# Patient Record
Sex: MALE | Race: WHITE | Employment: FULL TIME | URBAN - METROPOLITAN AREA
[De-identification: names, ages, dates, MRNs, and addresses within clinical notes are randomized per-mention and may not be internally consistent; named-entity substitution may affect disease eponyms.]

---

## 2024-09-18 ENCOUNTER — HOSPITAL ENCOUNTER (EMERGENCY)
Age: 54
Discharge: HOME | End: 2024-09-18
Attending: EMERGENCY MEDICINE
Payer: COMMERCIAL

## 2024-09-18 VITALS
WEIGHT: 172 LBS | RESPIRATION RATE: 18 BRPM | BODY MASS INDEX: 22.07 KG/M2 | OXYGEN SATURATION: 96 % | HEART RATE: 87 BPM | HEIGHT: 74 IN | TEMPERATURE: 98.5 F | DIASTOLIC BLOOD PRESSURE: 84 MMHG | SYSTOLIC BLOOD PRESSURE: 160 MMHG

## 2024-09-18 DIAGNOSIS — M54.31 SCIATICA OF RIGHT SIDE: Primary | ICD-10-CM

## 2024-09-18 DIAGNOSIS — M79.18 RIGHT BUTTOCK PAIN: ICD-10-CM

## 2024-09-18 PROCEDURE — 6370000000 HC RX 637 (ALT 250 FOR IP): Performed by: EMERGENCY MEDICINE

## 2024-09-18 PROCEDURE — 99283 EMERGENCY DEPT VISIT LOW MDM: CPT

## 2024-09-18 RX ORDER — IBUPROFEN 800 MG/1
800 TABLET, FILM COATED ORAL ONCE
Status: COMPLETED | OUTPATIENT
Start: 2024-09-18 | End: 2024-09-18

## 2024-09-18 RX ORDER — IBUPROFEN 800 MG/1
800 TABLET, FILM COATED ORAL EVERY 8 HOURS PRN
Qty: 30 TABLET | Refills: 0 | Status: SHIPPED | OUTPATIENT
Start: 2024-09-18

## 2024-09-18 RX ORDER — CYCLOBENZAPRINE HCL 10 MG
10 TABLET ORAL 3 TIMES DAILY PRN
Qty: 30 TABLET | Refills: 0 | Status: SHIPPED | OUTPATIENT
Start: 2024-09-18 | End: 2024-09-28

## 2024-09-18 RX ORDER — CYCLOBENZAPRINE HCL 10 MG
10 TABLET ORAL ONCE
Status: COMPLETED | OUTPATIENT
Start: 2024-09-18 | End: 2024-09-18

## 2024-09-18 RX ORDER — METHYLPREDNISOLONE 4 MG
TABLET, DOSE PACK ORAL
Qty: 1 KIT | Refills: 0 | Status: SHIPPED | OUTPATIENT
Start: 2024-09-18 | End: 2024-09-24

## 2024-09-18 RX ADMIN — IBUPROFEN 800 MG: 800 TABLET, FILM COATED ORAL at 18:42

## 2024-09-18 RX ADMIN — CYCLOBENZAPRINE 10 MG: 10 TABLET, FILM COATED ORAL at 18:42

## 2024-09-18 ASSESSMENT — PAIN SCALES - GENERAL: PAINLEVEL_OUTOF10: 2

## 2024-09-18 ASSESSMENT — PAIN - FUNCTIONAL ASSESSMENT: PAIN_FUNCTIONAL_ASSESSMENT: 0-10

## 2024-09-30 NOTE — PROGRESS NOTES
of Transportation (Non-Medical): No   Physical Activity: Not on file   Stress: Not on file   Social Connections: Not on file   Intimate Partner Violence: Not on file   Housing Stability: Unknown (10/1/2024)    Housing Stability Vital Sign    • Unable to Pay for Housing in the Last Year: Not on file    • Number of Times Moved in the Last Year: Not on file    • Homeless in the Last Year: No        Family History   Problem Relation Age of Onset   • Unknown Mother    • Anemia Mother    • Cancer Brother        PE  Vitals:    10/01/24 1449 10/01/24 1456   BP:  125/80   Site:  Left Upper Arm   Position:  Sitting   Cuff Size:  Medium Adult   Pulse:  93   Resp:  16   SpO2:  98%   Weight: 77.6 kg (171 lb)      Estimated body mass index is 21.96 kg/m² as calculated from the following:    Height as of 9/18/24: 1.88 m (6' 2\").    Weight as of this encounter: 77.6 kg (171 lb).    Physical Exam  Constitutional:       Appearance: Normal appearance.   Cardiovascular:      Rate and Rhythm: Normal rate and regular rhythm.      Heart sounds: No murmur heard.  Pulmonary:      Effort: Pulmonary effort is normal.      Breath sounds: Normal breath sounds. No wheezing.   Musculoskeletal:         General: Tenderness (Lateral aspect of right lower extremity [lateral gastrocnemius area]) present. No swelling or deformity. Normal range of motion.      Cervical back: Normal range of motion.      Lumbar back: No tenderness.      Right lower leg: No edema.      Left lower leg: No edema.      Comments: No hip tenderness to palpation, unable to reproduce click in hip with lateral flexion of right hip. Negative FADIR test of right hip.  No tenderness along iliotibial band of the right leg.    Skin:     General: Skin is warm.      Findings: No bruising, erythema, lesion or rash.   Neurological:      Mental Status: He is alert.      Sensory: Sensation is intact. No sensory deficit.      Motor: Weakness (4 out of 5 strength for right knee extension and

## 2024-10-01 ENCOUNTER — OFFICE VISIT (OUTPATIENT)
Dept: FAMILY MEDICINE CLINIC | Age: 54
End: 2024-10-01
Payer: COMMERCIAL

## 2024-10-01 VITALS
HEART RATE: 93 BPM | WEIGHT: 171 LBS | SYSTOLIC BLOOD PRESSURE: 125 MMHG | OXYGEN SATURATION: 98 % | DIASTOLIC BLOOD PRESSURE: 80 MMHG | RESPIRATION RATE: 16 BRPM | BODY MASS INDEX: 21.96 KG/M2

## 2024-10-01 DIAGNOSIS — M25.551 RIGHT HIP PAIN: Primary | ICD-10-CM

## 2024-10-01 DIAGNOSIS — M79.604 PAIN IN LATERAL RIGHT LOWER EXTREMITY: ICD-10-CM

## 2024-10-01 PROCEDURE — 99202 OFFICE O/P NEW SF 15 MIN: CPT

## 2024-10-01 PROCEDURE — G8484 FLU IMMUNIZE NO ADMIN: HCPCS

## 2024-10-01 PROCEDURE — G8420 CALC BMI NORM PARAMETERS: HCPCS

## 2024-10-01 PROCEDURE — G8427 DOCREV CUR MEDS BY ELIG CLIN: HCPCS

## 2024-10-01 PROCEDURE — 3017F COLORECTAL CA SCREEN DOC REV: CPT

## 2024-10-01 PROCEDURE — 1036F TOBACCO NON-USER: CPT

## 2024-10-01 SDOH — ECONOMIC STABILITY: FOOD INSECURITY: WITHIN THE PAST 12 MONTHS, THE FOOD YOU BOUGHT JUST DIDN'T LAST AND YOU DIDN'T HAVE MONEY TO GET MORE.: NEVER TRUE

## 2024-10-01 SDOH — ECONOMIC STABILITY: INCOME INSECURITY: HOW HARD IS IT FOR YOU TO PAY FOR THE VERY BASICS LIKE FOOD, HOUSING, MEDICAL CARE, AND HEATING?: NOT VERY HARD

## 2024-10-01 SDOH — ECONOMIC STABILITY: FOOD INSECURITY: WITHIN THE PAST 12 MONTHS, YOU WORRIED THAT YOUR FOOD WOULD RUN OUT BEFORE YOU GOT MONEY TO BUY MORE.: NEVER TRUE

## 2024-10-01 ASSESSMENT — PATIENT HEALTH QUESTIONNAIRE - PHQ9
SUM OF ALL RESPONSES TO PHQ QUESTIONS 1-9: 0
SUM OF ALL RESPONSES TO PHQ QUESTIONS 1-9: 0
1. LITTLE INTEREST OR PLEASURE IN DOING THINGS: NOT AT ALL
2. FEELING DOWN, DEPRESSED OR HOPELESS: NOT AT ALL
SUM OF ALL RESPONSES TO PHQ9 QUESTIONS 1 & 2: 0
SUM OF ALL RESPONSES TO PHQ QUESTIONS 1-9: 0
SUM OF ALL RESPONSES TO PHQ QUESTIONS 1-9: 0

## 2024-10-02 SDOH — HEALTH STABILITY: PHYSICAL HEALTH: ON AVERAGE, HOW MANY MINUTES DO YOU ENGAGE IN EXERCISE AT THIS LEVEL?: 90 MIN

## 2024-10-02 SDOH — HEALTH STABILITY: PHYSICAL HEALTH: ON AVERAGE, HOW MANY DAYS PER WEEK DO YOU ENGAGE IN MODERATE TO STRENUOUS EXERCISE (LIKE A BRISK WALK)?: 6 DAYS

## 2024-10-03 ENCOUNTER — OFFICE VISIT (OUTPATIENT)
Dept: ORTHOPEDIC SURGERY | Age: 54
End: 2024-10-03
Payer: COMMERCIAL

## 2024-10-03 ENCOUNTER — TELEPHONE (OUTPATIENT)
Dept: ORTHOPEDIC SURGERY | Age: 54
End: 2024-10-03

## 2024-10-03 VITALS — HEIGHT: 74 IN | WEIGHT: 171 LBS | BODY MASS INDEX: 21.94 KG/M2

## 2024-10-03 DIAGNOSIS — S76.311A TEAR OF RIGHT HAMSTRING: ICD-10-CM

## 2024-10-03 DIAGNOSIS — M54.31 SCIATICA OF RIGHT SIDE: ICD-10-CM

## 2024-10-03 DIAGNOSIS — R52 PAIN: Primary | ICD-10-CM

## 2024-10-03 PROCEDURE — 99203 OFFICE O/P NEW LOW 30 MIN: CPT | Performed by: ORTHOPAEDIC SURGERY

## 2024-10-03 PROCEDURE — 3017F COLORECTAL CA SCREEN DOC REV: CPT | Performed by: ORTHOPAEDIC SURGERY

## 2024-10-03 PROCEDURE — G8427 DOCREV CUR MEDS BY ELIG CLIN: HCPCS | Performed by: ORTHOPAEDIC SURGERY

## 2024-10-03 PROCEDURE — 1036F TOBACCO NON-USER: CPT | Performed by: ORTHOPAEDIC SURGERY

## 2024-10-03 PROCEDURE — G8484 FLU IMMUNIZE NO ADMIN: HCPCS | Performed by: ORTHOPAEDIC SURGERY

## 2024-10-03 PROCEDURE — G8420 CALC BMI NORM PARAMETERS: HCPCS | Performed by: ORTHOPAEDIC SURGERY

## 2024-10-03 NOTE — PROGRESS NOTES
apprehension  []  []Not tested   []  []Not tested    Uncontained Internal rotation  []  []Not tested  []  []Not tested          Abductors  [] All Neg  [] Not tested   [] All Neg  [] Not tested    Medius strength  []  []Not tested   []  []Not tested    Minimum strength  []  []Not tested   []  []Not tested    IT band tendonitis  []  []Not tested   []  []Not tested    Trochanteric tenderness  []  []Not tested  []  []Not tested   Sciatic neuropathic pain  []  []Not tested   []  []Not tested           Post-arthroplasty  [] All Neg  [] Not tested   [] All Neg  [] Not tested    Rectus tendonitis  []  []Not tested   []  []Not tested    Iliopsoas tendonitis       Start-up pain  []  []Not tested   []  []Not tested          Imaging    X-rays were ordered today ordered and reviewed of the lumbar spine.  2 views.  AP and lateral views.  No evidence of fractures or dislocations.    1 view of the hip was also ordered.  AP pelvis.  No evidence of fractures or dislocations with well-maintained joint space      Procedure:  Orders Placed This Encounter   Procedures    XR LUMBAR SPINE (2-3 VIEWS)     Standing Status:   Future     Number of Occurrences:   1     Standing Expiration Date:   10/3/2025       Assessment and Plan  Santana was seen today for hip pain.    Diagnoses and all orders for this visit:    Pain  -     XR LUMBAR SPINE (2-3 VIEWS); Future    Tear of right hamstring  -     MRI FEMUR RIGHT WO CONTRAST; Future    Sciatica of right side  -     MRI FEMUR RIGHT WO CONTRAST; Future        Patient has a mid substance hamstring injury.  Possible some involvement of its insertion onto the ischial tuberosity.  We will order an MRI right femur.  Patient also is presenting sciatic neuritis symptoms.  He may need a sciatic nerve release and possible hamstring repair.    I discussed with Santana Petty that his history, symptoms, signs, and imaging are most consistent with  hamstring tear .    We reviewed the natural history of these

## 2024-10-03 NOTE — TELEPHONE ENCOUNTER
MRI RIGHT FEMUR approved Auth# [id number]     Was approved for [facility]   Valid 10/3/2024 - 11/17/2024

## 2024-10-21 ENCOUNTER — OFFICE VISIT (OUTPATIENT)
Dept: ORTHOPEDIC SURGERY | Age: 54
End: 2024-10-21
Payer: COMMERCIAL

## 2024-10-21 VITALS — BODY MASS INDEX: 21.94 KG/M2 | HEIGHT: 74 IN | WEIGHT: 171 LBS

## 2024-10-21 DIAGNOSIS — S76.311A PARTIAL HAMSTRING TEAR, RIGHT, INITIAL ENCOUNTER: Primary | ICD-10-CM

## 2024-10-21 PROCEDURE — 3017F COLORECTAL CA SCREEN DOC REV: CPT | Performed by: PHYSICIAN ASSISTANT

## 2024-10-21 PROCEDURE — G8484 FLU IMMUNIZE NO ADMIN: HCPCS | Performed by: PHYSICIAN ASSISTANT

## 2024-10-21 PROCEDURE — 1036F TOBACCO NON-USER: CPT | Performed by: PHYSICIAN ASSISTANT

## 2024-10-21 PROCEDURE — G8427 DOCREV CUR MEDS BY ELIG CLIN: HCPCS | Performed by: PHYSICIAN ASSISTANT

## 2024-10-21 PROCEDURE — 99214 OFFICE O/P EST MOD 30 MIN: CPT | Performed by: PHYSICIAN ASSISTANT

## 2024-10-21 PROCEDURE — G8420 CALC BMI NORM PARAMETERS: HCPCS | Performed by: PHYSICIAN ASSISTANT

## 2024-10-21 NOTE — PROGRESS NOTES
Dr Sherwin Calvin      Date /Time 10/21/2024       8:42 AM EDT  Name Santana Petty             1970   [facility]  MRN [id number]                Chief Complaint   Patient presents with    Follow-up     TR MRI Right Thigh        History of Present Illness    Santana Petty is a 53 y.o. male who presents with  right hip pain.    Sent in consultation by No primary care provider on file., .      Injury Mechanism:  sports injury.  Worker's Comp. & legal issues:   none.  Previous Treatments: Ice, Heat, and NSAIDs    Patient presents the office today.  Patient being treated for hamstring injury.  Patient did have an MRI done of the right femur.  His pain has dramatically improved since last visit.  His current pain is 1/10.  His sciatic type symptoms which he had at his last visit have resolved at this time.  He denies any weakness or neurologic symptoms.    Previous History: Patient presents the office today for a new problem.  Patient here with a chief complaint of right hip pain.  On 2024 patient was participating in [employer].  He did a kick and felt a sharp pain over his ischial tuberosity.  He had a sharp pain that shot down his leg and the sciatic nerve distribution.  His hip pain has improved but continues have significant sciatic type symptoms.  He denies any weakness.    Past History  History reviewed. No pertinent past medical history.  Past Surgical History:   Procedure Laterality Date    SPINE SURGERY      10-15 years ago     Family History   Problem Relation Age of Onset    Unknown Mother     Anemia Mother     Cancer Brother      Social History     Tobacco Use    Smoking status: Former     Current packs/day: 0.00     Types: Cigarettes     Quit date:      Years since quittin.8     Passive exposure: Past    Smokeless tobacco: Never   Substance Use Topics    Alcohol use: Not Currently      Current Outpatient Medications on File Prior to Visit   Medication Sig Dispense

## 2024-10-22 DIAGNOSIS — N43.3 HYDROCELE, UNSPECIFIED HYDROCELE TYPE: Primary | ICD-10-CM

## 2024-10-31 ENCOUNTER — HOSPITAL ENCOUNTER (OUTPATIENT)
Dept: PHYSICAL THERAPY | Age: 54
Setting detail: THERAPIES SERIES
Discharge: HOME | End: 2024-10-31
Payer: COMMERCIAL

## 2024-10-31 DIAGNOSIS — R53.1 WEAKNESS: ICD-10-CM

## 2024-10-31 DIAGNOSIS — M79.604 RIGHT LEG PAIN: Primary | ICD-10-CM

## 2024-10-31 DIAGNOSIS — S76.301S HAMSTRING INJURY, RIGHT, SEQUELA: ICD-10-CM

## 2024-10-31 PROCEDURE — 97140 MANUAL THERAPY 1/> REGIONS: CPT

## 2024-10-31 PROCEDURE — 97161 PT EVAL LOW COMPLEX 20 MIN: CPT

## 2024-10-31 PROCEDURE — 97110 THERAPEUTIC EXERCISES: CPT

## 2024-10-31 NOTE — PLAN OF CARE
[facility]- Outpatient Rehabilitation and Therapy [address] [place] [address], [address], [place] [address] office: [phone] fax: [phone]     Physical Therapy Initial Evaluation Certification      Dear Santana Rose PA-C ,    We had the pleasure of evaluating the following patient for physical therapy services at [facility].  A summary of our findings can be found in the initial assessment below.  This includes our plan of care.  If you have any questions or concerns regarding these findings, please do not hesitate to contact me at the office phone number listed above.  Thank you for the referral.     Physician Signature:_______________________________Date:__________________  By signing above (or electronic signature), therapist’s plan is approved by physician       Physical Therapy: TREATMENT/PROGRESS NOTE   Patient: Santana Petty (53 y.o. male)   Examination Date: 10/31/2024   :  1970 MRN: [id number]   Visit #: 1   Insurance Allowable Auth Needed   20 v hard max []Yes    [x]No    Insurance: Payor: UNITED HE[employer]CARE / Plan: [employer] - CHOICE PLU / Product Type: *No Product type* /   Insurance ID: [id number] - (Commercial)  Secondary Insurance (if applicable):    Treatment Diagnosis:     ICD-10-CM    1. Right leg pain  M79.604       2. Hamstring injury, right, sequela  S76.301S       3. Weakness  R53.1          Medical Diagnosis:  Partial hamstring tear, right, initial encounter [S76.311A]   Referring Physician: Santana Rose PA-C  PCP: No primary care provider on file.     Plan of care signed (Y/N):     Date of Patient follow up with Physician:      Plan of Care Report: EVAL today  POC update due: (10 visits /OR AUTH LIMITS, whichever is less)   2024                                             Medical History:  Comorbidities:  Osteoarthritis  Prior Surgeries: L5S1 discectomy about 10 years ago  Relevant Medical History: No past medical history on

## 2024-11-05 ENCOUNTER — HOSPITAL ENCOUNTER (OUTPATIENT)
Dept: PHYSICAL THERAPY | Age: 54
Setting detail: THERAPIES SERIES
Discharge: HOME | End: 2024-11-05
Payer: COMMERCIAL

## 2024-11-05 PROCEDURE — 97140 MANUAL THERAPY 1/> REGIONS: CPT

## 2024-11-05 PROCEDURE — 97110 THERAPEUTIC EXERCISES: CPT

## 2024-11-08 ENCOUNTER — HOSPITAL ENCOUNTER (OUTPATIENT)
Dept: PHYSICAL THERAPY | Age: 54
Setting detail: THERAPIES SERIES
Discharge: HOME | End: 2024-11-08
Payer: COMMERCIAL

## 2024-11-08 PROCEDURE — 97112 NEUROMUSCULAR REEDUCATION: CPT

## 2024-11-08 PROCEDURE — 97530 THERAPEUTIC ACTIVITIES: CPT

## 2024-11-08 PROCEDURE — 97110 THERAPEUTIC EXERCISES: CPT

## 2024-11-08 NOTE — FLOWSHEET NOTE
education on joint protection, postural re-education, activity modification, and progression of HEP    Plan: Cont POC- Continue emphasis/focus on exercise progression, improving proper muscle recruitment and activation/motor control patterns, reduce/eliminate soft tissue swelling/inflammation/restriction, and improving soft tissue extensibility. Next visit plan to continue current phase     Electronically Signed by Nalini Felix PT  Date: 11/08/2024     Note: Portions of this note have been templated and/or copied from initial evaluation, reassessments and prior notes for documentation efficiency.    Note: If patient does not return for scheduled/recommended follow up visits, this note will serve as a discharge from care along with the most recent update on progress.    Ortho Evaluation

## 2024-11-12 ENCOUNTER — HOSPITAL ENCOUNTER (OUTPATIENT)
Dept: PHYSICAL THERAPY | Age: 54
Setting detail: THERAPIES SERIES
Discharge: HOME | End: 2024-11-12
Payer: COMMERCIAL

## 2024-11-12 PROCEDURE — 97110 THERAPEUTIC EXERCISES: CPT

## 2024-11-12 PROCEDURE — 97112 NEUROMUSCULAR REEDUCATION: CPT

## 2024-11-12 NOTE — FLOWSHEET NOTE
[facility]- Outpatient Rehabilitation and Therapy [address] [place] [address], [address], [place] [address] office: [phone] fax: [phone]         Physical Therapy: TREATMENT/PROGRESS NOTE   Patient: Santana Petty (53 y.o. male)   Examination Date: 2024   :  1970 MRN: [id number]   Visit #: 4   Insurance Allowable Auth Needed   20 v hard max []Yes    [x]No    Insurance: Payor: UNITED HE[employer]CARE / Plan: UNITED HE[employer]CARE - CHOICE PLUS / Product Type: *No Product type* /   Insurance ID: [id number] - (Commercial)  Secondary Insurance (if applicable):    Treatment Diagnosis:     ICD-10-CM    1. Right leg pain  M79.604       2. Hamstring injury, right, sequela  S76.301S       3. Weakness  R53.1          Medical Diagnosis:  Partial hamstring tear, right, initial encounter [S76.311A]   Referring Physician: Santana Rose PA-C  PCP: No primary care provider on file.     Plan of care signed (Y/N): Y eval    Date of Patient follow up with Physician:      Plan of Care Report: NO  POC update due: (10 visits /OR AUTH LIMITS, whichever is less)   2024                                             Medical History:  Comorbidities:  Osteoarthritis  Prior Surgeries: L5S1 discectomy about 10 years ago  Relevant Medical History: No past medical history on file.                                          Precautions/ Contra-indications:           Latex allergy:  NO  Pacemaker:    NO  Contraindications for Manipulation: None  Date of Surgery:    Other:    Red Flags:  None    Suicide Screening:   The patient did not verbalize a primary behavioral concern, suicidal ideation, suicidal intent, or demonstrate suicidal behaviors.    Preferred Language for Healthcare:   [x] English       [] other:    SUBJECTIVE EXAMINATION     Patient stated complaint: Patient reports pain is higher with driving having the pressure on his hamstrings.  Reports he did not go hiking over the weekend      10/31/24: The patient is a 53

## 2024-11-14 ENCOUNTER — HOSPITAL ENCOUNTER (OUTPATIENT)
Dept: PHYSICAL THERAPY | Age: 54
Setting detail: THERAPIES SERIES
Discharge: HOME | End: 2024-11-14
Payer: COMMERCIAL

## 2024-11-14 PROCEDURE — 97110 THERAPEUTIC EXERCISES: CPT

## 2024-11-14 PROCEDURE — 97140 MANUAL THERAPY 1/> REGIONS: CPT

## 2024-11-14 PROCEDURE — 97112 NEUROMUSCULAR REEDUCATION: CPT

## 2024-11-14 NOTE — FLOWSHEET NOTE
Gr I-IV mobilizations, Soft Tissue Mobilization, Dry Needling/IASTM, Trigger Point Release, and Myofascial Release  Modalities as needed that may include: Cryotherapy and Electrical Stimulation  Patient education on joint protection, postural re-education, activity modification, and progression of HEP    Plan:  Assess tolerance and progress as able.    Electronically Signed by Nalini Felix, JOHN       Date: 11/14/2024     Note: Portions of this note have been templated and/or copied from initial evaluation, reassessments and prior notes for documentation efficiency.    Note: If patient does not return for scheduled/recommended follow up visits, this note will serve as a discharge from care along with the most recent update on progress.

## 2024-11-18 ENCOUNTER — OFFICE VISIT (OUTPATIENT)
Dept: ORTHOPEDIC SURGERY | Age: 54
End: 2024-11-18
Payer: COMMERCIAL

## 2024-11-18 VITALS — WEIGHT: 171 LBS | BODY MASS INDEX: 21.94 KG/M2 | HEIGHT: 74 IN

## 2024-11-18 DIAGNOSIS — S76.311A PARTIAL HAMSTRING TEAR, RIGHT, INITIAL ENCOUNTER: Primary | ICD-10-CM

## 2024-11-18 PROCEDURE — 3017F COLORECTAL CA SCREEN DOC REV: CPT | Performed by: PHYSICIAN ASSISTANT

## 2024-11-18 PROCEDURE — G8484 FLU IMMUNIZE NO ADMIN: HCPCS | Performed by: PHYSICIAN ASSISTANT

## 2024-11-18 PROCEDURE — G8420 CALC BMI NORM PARAMETERS: HCPCS | Performed by: PHYSICIAN ASSISTANT

## 2024-11-18 PROCEDURE — 99213 OFFICE O/P EST LOW 20 MIN: CPT | Performed by: PHYSICIAN ASSISTANT

## 2024-11-18 PROCEDURE — G8427 DOCREV CUR MEDS BY ELIG CLIN: HCPCS | Performed by: PHYSICIAN ASSISTANT

## 2024-11-18 PROCEDURE — 1036F TOBACCO NON-USER: CPT | Performed by: PHYSICIAN ASSISTANT

## 2024-11-18 NOTE — PROGRESS NOTES
Dr Sherwin Calvin      Date /Time 11/18/2024       8:42 AM EDT  Name Santana Petty             1970   [facility]  MRN [id number]                Chief Complaint   Patient presents with    Follow-up     Ck RIght Hamstring         History of Present Illness    Santana Petty is a 53 y.o. male who presents with  right hip pain.    Sent in consultation by No primary care provider on file., .      Injury Mechanism:  sports injury.  Worker's Comp. & legal issues:   none.  Previous Treatments: Ice, Heat, and NSAIDs    Patient being treated for a 2 part partial injury today  semimembranosus of the hamstring.  Patient was doing well with less pain.  He then switched therapists and had increased pain afterwards.  He feels that she may have overstretched the hamstring.  He is slowly improving again.    Previous history: Patient presents the office today.  Patient being treated for hamstring injury.  Patient did have an MRI done of the right femur.  His pain has dramatically improved since last visit.  His current pain is 1/10.  His sciatic type symptoms which he had at his last visit have resolved at this time.  He denies any weakness or neurologic symptoms.    Previous History: Patient presents the office today for a new problem.  Patient here with a chief complaint of right hip pain.  On 9/18/2024 patient was participating in [employer].  He did a kick and felt a sharp pain over his ischial tuberosity.  He had a sharp pain that shot down his leg and the sciatic nerve distribution.  His hip pain has improved but continues have significant sciatic type symptoms.  He denies any weakness.    Past History  History reviewed. No pertinent past medical history.  Past Surgical History:   Procedure Laterality Date    SPINE SURGERY      10-15 years ago     Family History   Problem Relation Age of Onset    Unknown Mother     Anemia Mother     Cancer Brother      Social History     Tobacco Use    Smoking

## 2024-11-19 ENCOUNTER — HOSPITAL ENCOUNTER (OUTPATIENT)
Dept: PHYSICAL THERAPY | Age: 54
Setting detail: THERAPIES SERIES
Discharge: HOME | End: 2024-11-19
Payer: COMMERCIAL

## 2024-11-19 PROCEDURE — 97140 MANUAL THERAPY 1/> REGIONS: CPT

## 2024-11-19 PROCEDURE — 97112 NEUROMUSCULAR REEDUCATION: CPT

## 2024-11-19 PROCEDURE — 97110 THERAPEUTIC EXERCISES: CPT

## 2024-11-19 NOTE — FLOWSHEET NOTE
[facility]- Outpatient Rehabilitation and Therapy [facility], [address], [place] [address] office: [phone] fax: [phone]         Physical Therapy: TREATMENT/PROGRESS NOTE   Patient: Santana Petty (53 y.o. male)   Examination Date: 2024   :  1970 MRN: [id number]   Visit #: 6   Insurance Allowable Auth Needed   20 v hard max []Yes    [x]No    Insurance: Payor: UNITED HEALTHCARE / Plan: UNITED HEALTHCARE - CHOICE PLUS / Product Type: *No Product type* /   Insurance ID: [id number] - (Commercial)  Secondary Insurance (if applicable):    Treatment Diagnosis:     ICD-10-CM    1. Right leg pain  M79.604       2. Hamstring injury, right, sequela  S76.301S       3. Weakness  R53.1          Medical Diagnosis:  Partial hamstring tear, right, initial encounter [S76.311A]   Referring Physician: Santana Rose PA-C  PCP: No primary care provider on file.     Plan of care signed (Y/N): Y eval    Date of Patient follow up with Physician:      Plan of Care Report: NO  POC update due: (10 visits /OR AUTH LIMITS, whichever is less)   2024                                             Medical History:  Comorbidities:  Osteoarthritis  Prior Surgeries: L5S1 discectomy about 10 years ago  Relevant Medical History: No past medical history on file.                                          Precautions/ Contra-indications:           Latex allergy:  NO  Pacemaker:    NO  Contraindications for Manipulation: None  Date of Surgery:    Other:    Red Flags:  None    Suicide Screening:   The patient did not verbalize a primary behavioral concern, suicidal ideation, suicidal intent, or demonstrate suicidal behaviors.    Preferred Language for Healthcare:   [x] English       [] other:    SUBJECTIVE EXAMINATION     Patient stated complaint: Patient reports he saw the doctor and he said if he does not do anything stupid he will be able to do some martial arts in 5-6 wks  reports he is still a bit sore.

## 2024-11-22 ENCOUNTER — HOSPITAL ENCOUNTER (OUTPATIENT)
Dept: PHYSICAL THERAPY | Age: 54
Setting detail: THERAPIES SERIES
Discharge: HOME | End: 2024-11-22
Payer: COMMERCIAL

## 2024-11-22 PROCEDURE — 97110 THERAPEUTIC EXERCISES: CPT

## 2024-11-22 PROCEDURE — 97112 NEUROMUSCULAR REEDUCATION: CPT

## 2024-11-22 PROCEDURE — 97140 MANUAL THERAPY 1/> REGIONS: CPT

## 2024-11-22 NOTE — FLOWSHEET NOTE
training.   Manual Therapy (93094) as indicated to include: Passive Range of Motion, Gr I-IV mobilizations, Soft Tissue Mobilization, Dry Needling/IASTM, Trigger Point Release, and Myofascial Release  Modalities as needed that may include: Cryotherapy and Electrical Stimulation  Patient education on joint protection, postural re-education, activity modification, and progression of HEP    Plan:  Progress exercises next visit.    Electronically Signed by Nalini Felix PT       Date: 11/22/2024     Note: Portions of this note have been templated and/or copied from initial evaluation, reassessments and prior notes for documentation efficiency.    Note: If patient does not return for scheduled/recommended follow up visits, this note will serve as a discharge from care along with the most recent update on progress.

## 2024-11-25 ENCOUNTER — HOSPITAL ENCOUNTER (OUTPATIENT)
Dept: PHYSICAL THERAPY | Age: 54
Setting detail: THERAPIES SERIES
Discharge: HOME | End: 2024-11-25
Payer: COMMERCIAL

## 2024-11-25 PROCEDURE — 97140 MANUAL THERAPY 1/> REGIONS: CPT

## 2024-11-25 PROCEDURE — 97112 NEUROMUSCULAR REEDUCATION: CPT

## 2024-11-25 PROCEDURE — 97110 THERAPEUTIC EXERCISES: CPT

## 2024-11-25 NOTE — FLOWSHEET NOTE
18th, 2024. The pain started when warming up for [employer], and did a round house kick and felt severe pain in the whole hamstring. Symptoms include right leg pain, decreased flexibility, weakness and numbness and tingling.  Aggravating factors include driving, sitting, bending to touch toes, and [employer].  Easing factors include lying on stomach, standing, walking, Alieve and Tiger balm.        Test used Initial score  10/31/24 11/25/2024   Pain Summary VAS 1/10 at rest  4/10 with activity 0/10 at rest if not sitting  2-3/10 with sitting and driving   Functional questionnaire LEFS 64/80=80%  100-80=20% impairment    Other:                   OBJECTIVE EXAMINATION         11/22/24: Presented with left ant ilium with SB sacrum  11/14/24: Presented with left ant ilium with SB sacrum, muscle guarding and pain in the hamstring and psoas group.  11/5/24: left ant ilium only today.  Less TTP along the medial HS.      ROM/Strength: (Blank cells denote NT) Right upslip with left ant ilium with SB sacrum     Mvmt (norm) AROM L AROM R Notes     LUMBAR Flex (90)      Ext (25)      SB (25)       Rotation (30)          HIP Flex (120) 120 120     Abd (45) 45 45     ER (50)       IR (45)       Ext (20) 20 20    KNEE Flex (140) 145 145     Ext (0) 0 0          MMT L MMT R Notes       HIP  Flexion 3- 3-      Abduction 3- 3-      ER        IR        Extension 3- 3-    KNEE  Flexion 4+ 4+      Extension 4+ 4+              Exercises/Interventions     Therapeutic Ex (54993)   NMR re-education (85657) resistance Sets/time     Reps Notes/Cues/Progressions   HEP  10'  Reviewed and modified          Upright bike  L5 5'     HF stretch with strap  30\" 4 (B)   HS stretch with strap*  30\" 2 (B)   Bridge with hip abd green 2 10    Hip abd  2 10 (B)   Sit to stands   2 10    Retro walking (toe to heel)   2'     Gastroc and soleus stretching   30\" 2 each   Stairs    x5     Carpet sliders  2 10    BOSU squats   2 10    Floor ladder:

## 2024-11-27 ENCOUNTER — HOSPITAL ENCOUNTER (OUTPATIENT)
Dept: PHYSICAL THERAPY | Age: 54
Setting detail: THERAPIES SERIES
Discharge: HOME | End: 2024-11-27
Payer: COMMERCIAL

## 2024-11-27 PROCEDURE — 97110 THERAPEUTIC EXERCISES: CPT

## 2024-11-27 PROCEDURE — 97112 NEUROMUSCULAR REEDUCATION: CPT

## 2024-11-27 PROCEDURE — 97140 MANUAL THERAPY 1/> REGIONS: CPT

## 2024-11-27 NOTE — FLOWSHEET NOTE
reps  - Sidelying Hip Abduction  - 1 x daily - 7 x weekly - 2 sets - 10 reps  - Supine Sciatic Nerve Glide  - 1 x daily - 7 x weekly - 2 sets - 10 reps  - Supine Hamstring Stretch with Strap  - 1 x daily - 7 x weekly - 1 sets - 2 reps - 30 sec hold  - Child's Pose Stretch  - 1 x daily - 7 x weekly - 1 sets - 2 reps - 15 sec hold  - Child's Pose with Sidebending  - 1 x daily - 7 x weekly - 1 sets - 2 reps - 15 sec hold  - Gastroc Stretch with Foot at Wall  - 1 x daily - 7 x weekly - 1 sets - 2 reps - 30 sec hold      ASSESSMENT        Today's Assessment:  Patient completed all activities ok.  Continues to report pain in the origin of HS, worse with sitting long time.  Discontinued donkey kicks and added new NMR proprioceptive balance activities.  Less TTP but pain with activities non injury leg likely secondary to compensatory patterning.      Medical Necessity Documentation:  I certify that this patient meets the below criteria necessary for medical necessity for care and/or justification of therapy services:  The patient has functional impairments and/or activity limitations and would benefit from continued outpatient therapy services to address the deficits outlined in the patients goals    Return to Play: NA    Prognosis for POC: [x] Good [] Fair  [] Poor    Patient requires continued skilled intervention: [x] Yes  [] No      CHARGE CAPTURE     PT CHARGE GRID   CPT Code (TIMED) minutes # CPT Code (UNTIMED) #     Therex (00373)  15 1  EVAL:LOW (01623 - Typically 20 minutes face-to-face)      Neuromusc. Re-ed (11390) 15 1  Re-Eval (63653)     Manual (13137) 15 1  Estim Unattended (86000)     Ther. Act (62819)    [facility]. Traction (20053)     Gait (86032)    Dry Needle 1-2 muscle (99450)     Aquatic Therex (02982)    Dry Needle 3+ muscle (20561)     Iontophoresis (44218)    VASO (47742)     Ultrasound (09138)    Group Therapy (25962)     Estim Attended (65871)    Canalith Repositioning (49108)     Physical Performance

## 2024-12-03 ENCOUNTER — HOSPITAL ENCOUNTER (OUTPATIENT)
Dept: PHYSICAL THERAPY | Age: 54
Setting detail: THERAPIES SERIES
Discharge: HOME | End: 2024-12-03
Payer: COMMERCIAL

## 2024-12-03 PROCEDURE — 97110 THERAPEUTIC EXERCISES: CPT

## 2024-12-03 PROCEDURE — 97140 MANUAL THERAPY 1/> REGIONS: CPT

## 2024-12-03 PROCEDURE — 97112 NEUROMUSCULAR REEDUCATION: CPT

## 2024-12-03 NOTE — FLOWSHEET NOTE
[facility]- Outpatient Rehabilitation and Therapy [address] [place] [address], [address], [place] [address] office: [phone] fax: [phone]         Physical Therapy: TREATMENT/PROGRESS NOTE   Patient: Santana Petty (53 y.o. male)   Examination Date: 2024   :  1970 MRN: [id number]   Visit #: 10   Insurance Allowable Auth Needed   20 v hard max []Yes    [x]No    Insurance: Payor: UNITED HEALTHCARE / Plan: UNITED HEALTHCARE - CHOICE PLUS / Product Type: *No Product type* /   Insurance ID: [id number] - (Commercial)  Secondary Insurance (if applicable):    Treatment Diagnosis:     ICD-10-CM    1. Right leg pain  M79.604       2. Hamstring injury, right, sequela  S76.301S       3. Weakness  R53.1          Medical Diagnosis:  Partial hamstring tear, right, initial encounter [S76.311A]   Referring Physician: Santana Rose PA-C  PCP: No primary care provider on file.     Plan of care signed (Y/N): Y eval    Date of Patient follow up with Physician:      Plan of Care Report: NO  POC update due: (10 visits /OR AUTH LIMITS, whichever is less)   2024                                             Medical History:  Comorbidities:  Osteoarthritis  Prior Surgeries: L5S1 discectomy about 10 years ago  Relevant Medical History: No past medical history on file.                                          Precautions/ Contra-indications:           Latex allergy:  NO  Pacemaker:    NO  Contraindications for Manipulation: None  Date of Surgery:    Other:    Red Flags:  None    Suicide Screening:   The patient did not verbalize a primary behavioral concern, suicidal ideation, suicidal intent, or demonstrate suicidal behaviors.    Preferred Language for Healthcare:   [x] English       [] other:    SUBJECTIVE EXAMINATION     Patient stated complaint: Patient reports no changes since last visit      10/31/24: The patient is a 53 year old being seen in physical therapy for right HS strain. DOI: 2024.

## 2024-12-05 ENCOUNTER — HOSPITAL ENCOUNTER (OUTPATIENT)
Dept: PHYSICAL THERAPY | Age: 54
Setting detail: THERAPIES SERIES
Discharge: HOME | End: 2024-12-05
Payer: COMMERCIAL

## 2024-12-05 PROCEDURE — 97140 MANUAL THERAPY 1/> REGIONS: CPT

## 2024-12-05 PROCEDURE — 97530 THERAPEUTIC ACTIVITIES: CPT

## 2024-12-05 PROCEDURE — 97110 THERAPEUTIC EXERCISES: CPT

## 2024-12-05 NOTE — PLAN OF CARE
Adjusted  2. Patient will have a decrease in pain to <0/10 to facilitate improvement in movement, function, and ADLs as indicated by Functional Deficits.  [x] Progressing: [] Met: [] Not Met: [] Adjusted    Long Term Goals: To be achieved in: 4-6 weeks  1. Disability index score of 10% or less for the LEFS to assist with reaching prior level of function with activities such as sitting activities.  [x] Progressing: [] Met: [] Not Met: [] Adjusted  2.  Patient will demonstrate increased Strength of LE's to at least 5/5 throughout without pain to allow for proper functional mobility to enable patient to return to [employer].   [x] Progressing: [] Met: [] Not Met: [] Adjusted  3. Patient will return to stretching activities without increased symptoms or restriction.   [x] Progressing: [] Met: [] Not Met: [] Adjusted        Overall Progression Towards Functional goals/ Treatment Progress Update:  [x] Patient is progressing as expected towards functional goals listed.    [x] Progression is slowed due to complexities/Impairments listed.  [] Progression has been slowed due to co-morbidities.  [] Plan just implemented, too soon (<30days) to assess goals progression   [] Goals require adjustment due to lack of progress  [] Patient is not progressing as expected and requires additional follow up with physician  [] Other:     TREATMENT PLAN     Frequency/Duration: 1-2x/week for 4-6 weeks for the following treatment interventions:    Interventions:  Therapeutic Exercise (51315) including: strength training, ROM, and functional mobility  Therapeutic Activities (92044) including: functional mobility training and education.  Neuromuscular Re-education (36507) activation and proprioception, including postural re-education.    Gait Training (39830) for normalization of ambulation patterns and AD training.   Manual Therapy (96850) as indicated to include: Passive Range of Motion, Gr I-IV mobilizations, Soft Tissue Mobilization, Dry

## 2024-12-10 ENCOUNTER — HOSPITAL ENCOUNTER (OUTPATIENT)
Dept: PHYSICAL THERAPY | Age: 54
Setting detail: THERAPIES SERIES
Discharge: HOME | End: 2024-12-10
Payer: COMMERCIAL

## 2024-12-10 PROCEDURE — 97110 THERAPEUTIC EXERCISES: CPT

## 2024-12-10 PROCEDURE — 97140 MANUAL THERAPY 1/> REGIONS: CPT

## 2024-12-10 NOTE — FLOWSHEET NOTE
[facility]- Outpatient Rehabilitation and Therapy [address] [place] [address], [address], [place] [address] office: [phone] fax: [phone]           Physical Therapy: TREATMENT/PROGRESS NOTE   Patient: Santana Petty (53 y.o. male)   Examination Date: 12/10/2024   :  1970 MRN: [id number]   Visit #: 12   Insurance Allowable Auth Needed   20 v hard max []Yes    [x]No    Insurance: Payor: [employer] / Plan: [employer] - CHOICE PLUS / Product Type: *No Product type* /   Insurance ID: [id number] - (Commercial)  Secondary Insurance (if applicable):    Treatment Diagnosis:     ICD-10-CM    1. Right leg pain  M79.604       2. Hamstring injury, right, sequela  S76.301S       3. Weakness  R53.1          Medical Diagnosis:  Partial hamstring tear, right, initial encounter [S76.311A]   Referring Physician: Santana Rose PA-C  PCP: No primary care provider on file.     Plan of care signed (Y/N): Y eval    Date of Patient follow up with Physician:      Plan of Care Report: YES, Date Range for this report: 10/31/24 to 24  POC update due: (10 visits /OR AUTH LIMITS, whichever is less)   25                                             Medical History:  Comorbidities:  Osteoarthritis  Prior Surgeries: L5S1 discectomy about 10 years ago  Relevant Medical History: No past medical history on file.                                          Precautions/ Contra-indications:           Latex allergy:  NO  Pacemaker:    NO  Contraindications for Manipulation: None  Date of Surgery:    Other:    Red Flags:  None    Suicide Screening:   The patient did not verbalize a primary behavioral concern, suicidal ideation, suicidal intent, or demonstrate suicidal behaviors.    Preferred Language for Healthcare:   [x] English       [] other:    SUBJECTIVE EXAMINATION     Patient stated complaint: Patient reports doing much better after changing seat position.  Less pain with driving.  Continues to struggle with

## 2024-12-13 ENCOUNTER — HOSPITAL ENCOUNTER (OUTPATIENT)
Dept: PHYSICAL THERAPY | Age: 54
Setting detail: THERAPIES SERIES
Discharge: HOME | End: 2024-12-13
Payer: COMMERCIAL

## 2024-12-13 PROCEDURE — 97530 THERAPEUTIC ACTIVITIES: CPT

## 2024-12-13 PROCEDURE — 97140 MANUAL THERAPY 1/> REGIONS: CPT

## 2024-12-13 PROCEDURE — 97110 THERAPEUTIC EXERCISES: CPT

## 2024-12-13 NOTE — FLOWSHEET NOTE
[facility]- Outpatient Rehabilitation and Therapy [address] [place] [address], [address], [place] [address] office: [phone] fax: [phone]           Physical Therapy: TREATMENT/PROGRESS NOTE   Patient: Santana Petty (53 y.o. male)   Examination Date: 2024   :  1970 MRN: [id number]   Visit #: 13   Insurance Allowable Auth Needed   20 v hard max []Yes    [x]No    Insurance: Payor: [employer] / Plan: [employer] - CHOICE PLUS / Product Type: *No Product type* /   Insurance ID: [id number] - (Commercial)  Secondary Insurance (if applicable):    Treatment Diagnosis:     ICD-10-CM    1. Right leg pain  M79.604       2. Hamstring injury, right, sequela  S76.301S       3. Weakness  R53.1          Medical Diagnosis:  Partial hamstring tear, right, initial encounter [S76.311A]   Referring Physician: Santana Rose PA-C  PCP: No primary care provider on file.     Plan of care signed (Y/N): Y eval    Date of Patient follow up with Physician:      Plan of Care Report: YES, Date Range for this report: 10/31/24 to 24  POC update due: (10 visits /OR AUTH LIMITS, whichever is less)   25                                             Medical History:  Comorbidities:  Osteoarthritis  Prior Surgeries: L5S1 discectomy about 10 years ago  Relevant Medical History: No past medical history on file.                                          Precautions/ Contra-indications:           Latex allergy:  NO  Pacemaker:    NO  Contraindications for Manipulation: None  Date of Surgery:    Other:    Red Flags:  None    Suicide Screening:   The patient did not verbalize a primary behavioral concern, suicidal ideation, suicidal intent, or demonstrate suicidal behaviors.    Preferred Language for Healthcare:   [x] English       [] other:    SUBJECTIVE EXAMINATION     Patient stated complaint:  Patient reports doing better overall.  Pain while driving improving well.       10/31/24: The patient is a 53 year 
Xray Chest 1 View-PORTABLE IMMEDIATE

## 2024-12-17 ENCOUNTER — HOSPITAL ENCOUNTER (OUTPATIENT)
Dept: PHYSICAL THERAPY | Age: 54
Setting detail: THERAPIES SERIES
Discharge: HOME | End: 2024-12-17
Payer: COMMERCIAL

## 2024-12-17 PROCEDURE — 97140 MANUAL THERAPY 1/> REGIONS: CPT

## 2024-12-17 PROCEDURE — 97110 THERAPEUTIC EXERCISES: CPT

## 2024-12-17 NOTE — FLOWSHEET NOTE
weeks for the following treatment interventions:    Interventions:  Therapeutic Exercise (34597) including: strength training, ROM, and functional mobility  Therapeutic Activities (47244) including: functional mobility training and education.  Neuromuscular Re-education (60215) activation and proprioception, including postural re-education.    Gait Training (40967) for normalization of ambulation patterns and AD training.   Manual Therapy (80042) as indicated to include: Passive Range of Motion, Gr I-IV mobilizations, Soft Tissue Mobilization, Dry Needling/IASTM, Trigger Point Release, and Myofascial Release  Modalities as needed that may include: Cryotherapy and Electrical Stimulation  Patient education on joint protection, postural re-education, activity modification, and progression of HEP    Plan: Cont POC- Continue emphasis/focus on exercise progression and improving soft tissue extensibility. Next visit plan to continue max protect phase      Electronically Signed by Nalini Felix PT         Date: 12/17/2024     Note: Portions of this note have been templated and/or copied from initial evaluation, reassessments and prior notes for documentation efficiency.    Note: If patient does not return for scheduled/recommended follow up visits, this note will serve as a discharge from care along with the most recent update on progress.

## 2024-12-20 ENCOUNTER — HOSPITAL ENCOUNTER (OUTPATIENT)
Dept: PHYSICAL THERAPY | Age: 54
Setting detail: THERAPIES SERIES
Discharge: HOME | End: 2024-12-20
Payer: COMMERCIAL

## 2024-12-20 PROCEDURE — 97140 MANUAL THERAPY 1/> REGIONS: CPT

## 2024-12-20 PROCEDURE — 97110 THERAPEUTIC EXERCISES: CPT

## 2024-12-20 NOTE — FLOWSHEET NOTE
[facility]- Outpatient Rehabilitation and Therapy [address] [place] [address], [address], [place] [address] office: [phone] fax: [phone]           Physical Therapy: TREATMENT/PROGRESS NOTE   Patient: Santana Petty (53 y.o. male)   Examination Date: 2024   :  1970 MRN: [id number]   Visit #: 15   Insurance Allowable Auth Needed   20 v hard max []Yes    [x]No    Insurance: Payor: [employer] / Plan: UNITED HE[employer] - CHOICE PLUS / Product Type: *No Product type* /   Insurance ID: [id number] - (Commercial)  Secondary Insurance (if applicable):    Treatment Diagnosis:     ICD-10-CM    1. Right leg pain  M79.604       2. Hamstring injury, right, sequela  S76.301S       3. Weakness  R53.1          Medical Diagnosis:  Partial hamstring tear, right, initial encounter [S76.311A]   Referring Physician: Santana Rose PA-C  PCP: No primary care provider on file.     Plan of care signed (Y/N): Y eval    Date of Patient follow up with Physician:      Plan of Care Report: NO  POC update due: (10 visits /OR AUTH LIMITS, whichever is less)   25                  POC: 24                           Medical History:  Comorbidities:  Osteoarthritis  Prior Surgeries: L5S1 discectomy about 10 years ago  Relevant Medical History: No past medical history on file.                                          Precautions/ Contra-indications:           Latex allergy:  NO  Pacemaker:    NO  Contraindications for Manipulation: None  Date of Surgery:    Other:    Red Flags:  None    Suicide Screening:   The patient did not verbalize a primary behavioral concern, suicidal ideation, suicidal intent, or demonstrate suicidal behaviors.    Preferred Language for Healthcare:   [x] English       [] other:    SUBJECTIVE EXAMINATION     Patient stated complaint:  Patient was doing better until she stepped off a step wrong and jammed his lumbar spine.  Severe pain in the back.        10/31/24: The patient is a 53

## 2024-12-26 ENCOUNTER — HOSPITAL ENCOUNTER (OUTPATIENT)
Dept: PHYSICAL THERAPY | Age: 54
Setting detail: THERAPIES SERIES
Discharge: HOME | End: 2024-12-26
Payer: COMMERCIAL

## 2024-12-26 PROCEDURE — 97140 MANUAL THERAPY 1/> REGIONS: CPT

## 2024-12-26 PROCEDURE — 97110 THERAPEUTIC EXERCISES: CPT

## 2024-12-26 NOTE — FLOWSHEET NOTE
Act (18412)       [facility]. Traction (50399)     Gait (27246)    Dry Needle 1-2 muscle (75356)     Aquatic Therex (44380)    Dry Needle 3+ muscle (78593)     Iontophoresis (29187)    VASO (52930)     Ultrasound (92198)    Group Therapy (90947)     Estim Attended (67063)    Canalith Repositioning (93944)     Physical Performance Test (05261)         Other:    Other:    Total Timed Code Tx Minutes 53 4        Total Treatment Minutes 53        Charge Justification:  (72435) THERAPEUTIC EXERCISE - Provided verbal/tactile cueing for activities related to strengthening, flexibility, endurance, ROM performed to prevent loss of range of motion, maintain or improve muscular strength or increase flexibility, following either an injury or surgery.   (91218) HOME EXERCISE PROGRAM - Reviewed/Progressed HEP activities related to strengthening, flexibility, endurance, ROM performed to prevent loss of range of motion, maintain or improve muscular strength or increase flexibility, following either an injury or surgery.  (78835) NEUROMUSCULAR RE-EDUCATION - Therapeutic procedure, 1 or more areas, each 15 minutes; neuromuscular reeducation of movement, balance, coordination, kinesthetic sense, posture, and/or proprioception for sitting and/or standing activities  (83846) THERAPEUTIC ACTIVITY - use of dynamic activities to improve functional performance. (Ex include squatting, ascending/descending stairs, walking, bending, lifting, catching, throwing, pushing, pulling, jumping.)  Direct, one on one contact, billed in 15-minute increments.  (36245) MANUAL THERAPY -  Manual therapy techniques, 1 or more regions, each 15 minutes (Mobilization/manipulation, manual lymphatic drainage, manual traction) for the purpose of modulating pain, promoting relaxation,  increasing ROM, reducing/eliminating soft tissue swelling/inflammation/restriction, improving soft tissue extensibility and allowing for proper ROM for normal function with self care,

## 2025-04-03 SDOH — ECONOMIC STABILITY: INCOME INSECURITY: IN THE LAST 12 MONTHS, WAS THERE A TIME WHEN YOU WERE NOT ABLE TO PAY THE MORTGAGE OR RENT ON TIME?: NO

## 2025-04-03 SDOH — ECONOMIC STABILITY: FOOD INSECURITY: WITHIN THE PAST 12 MONTHS, THE FOOD YOU BOUGHT JUST DIDN'T LAST AND YOU DIDN'T HAVE MONEY TO GET MORE.: NEVER TRUE

## 2025-04-03 SDOH — ECONOMIC STABILITY: FOOD INSECURITY: WITHIN THE PAST 12 MONTHS, YOU WORRIED THAT YOUR FOOD WOULD RUN OUT BEFORE YOU GOT MONEY TO BUY MORE.: NEVER TRUE

## 2025-04-03 SDOH — ECONOMIC STABILITY: TRANSPORTATION INSECURITY
IN THE PAST 12 MONTHS, HAS LACK OF TRANSPORTATION KEPT YOU FROM MEETINGS, WORK, OR FROM GETTING THINGS NEEDED FOR DAILY LIVING?: NO

## 2025-04-03 SDOH — ECONOMIC STABILITY: TRANSPORTATION INSECURITY
IN THE PAST 12 MONTHS, HAS THE LACK OF TRANSPORTATION KEPT YOU FROM MEDICAL APPOINTMENTS OR FROM GETTING MEDICATIONS?: NO

## 2025-04-03 ASSESSMENT — PATIENT HEALTH QUESTIONNAIRE - PHQ9
1. LITTLE INTEREST OR PLEASURE IN DOING THINGS: NOT AT ALL
SUM OF ALL RESPONSES TO PHQ9 QUESTIONS 1 & 2: 0
2. FEELING DOWN, DEPRESSED OR HOPELESS: NOT AT ALL
1. LITTLE INTEREST OR PLEASURE IN DOING THINGS: NOT AT ALL
2. FEELING DOWN, DEPRESSED OR HOPELESS: NOT AT ALL
SUM OF ALL RESPONSES TO PHQ QUESTIONS 1-9: 0

## 2025-04-04 ENCOUNTER — OFFICE VISIT (OUTPATIENT)
Dept: FAMILY MEDICINE CLINIC | Age: 55
End: 2025-04-04
Payer: COMMERCIAL

## 2025-04-04 VITALS
OXYGEN SATURATION: 97 % | SYSTOLIC BLOOD PRESSURE: 128 MMHG | HEART RATE: 75 BPM | WEIGHT: 180.6 LBS | HEIGHT: 74 IN | DIASTOLIC BLOOD PRESSURE: 72 MMHG | RESPIRATION RATE: 16 BRPM | BODY MASS INDEX: 23.18 KG/M2

## 2025-04-04 DIAGNOSIS — Z13.1 SCREENING FOR DIABETES MELLITUS: ICD-10-CM

## 2025-04-04 DIAGNOSIS — H26.9 CATARACT OF BOTH EYES, UNSPECIFIED CATARACT TYPE: ICD-10-CM

## 2025-04-04 DIAGNOSIS — Z87.891 PERSONAL HISTORY OF TOBACCO USE: ICD-10-CM

## 2025-04-04 DIAGNOSIS — D17.22 LIPOMA OF LEFT UPPER EXTREMITY: ICD-10-CM

## 2025-04-04 DIAGNOSIS — Z13.220 SCREENING, LIPID: Primary | ICD-10-CM

## 2025-04-04 DIAGNOSIS — Z13.0 ENCOUNTER FOR SCREENING FOR DISEASES OF THE BLOOD AND BLOOD-FORMING ORGANS AND CERTAIN DISORDERS INVOLVING THE IMMUNE MECHANISM: ICD-10-CM

## 2025-04-04 DIAGNOSIS — Z12.11 SCREENING FOR COLORECTAL CANCER: ICD-10-CM

## 2025-04-04 DIAGNOSIS — Z12.12 SCREENING FOR COLORECTAL CANCER: ICD-10-CM

## 2025-04-04 PROCEDURE — 1036F TOBACCO NON-USER: CPT

## 2025-04-04 PROCEDURE — 3017F COLORECTAL CA SCREEN DOC REV: CPT

## 2025-04-04 PROCEDURE — G8427 DOCREV CUR MEDS BY ELIG CLIN: HCPCS

## 2025-04-04 PROCEDURE — G0296 VISIT TO DETERM LDCT ELIG: HCPCS

## 2025-04-04 PROCEDURE — 99214 OFFICE O/P EST MOD 30 MIN: CPT

## 2025-04-04 PROCEDURE — G8420 CALC BMI NORM PARAMETERS: HCPCS

## 2025-04-05 LAB
ALBUMIN SERPL-MCNC: 4.5 G/DL (ref 3.4–5)
ALBUMIN/GLOB SERPL: 1.9 {RATIO} (ref 1.1–2.2)
ALP SERPL-CCNC: 71 U/L (ref 40–129)
ALT SERPL-CCNC: 24 U/L (ref 10–40)
ANION GAP SERPL CALCULATED.3IONS-SCNC: 11 MMOL/L (ref 3–16)
AST SERPL-CCNC: 24 U/L (ref 15–37)
BASOPHILS # BLD: 0.1 K/UL (ref 0–0.2)
BASOPHILS NFR BLD: 0.7 %
BILIRUB SERPL-MCNC: <0.2 MG/DL (ref 0–1)
BUN SERPL-MCNC: 22 MG/DL (ref 7–20)
CALCIUM SERPL-MCNC: 9.5 MG/DL (ref 8.3–10.6)
CHLORIDE SERPL-SCNC: 101 MMOL/L (ref 99–110)
CHOLEST SERPL-MCNC: 227 MG/DL (ref 0–199)
CO2 SERPL-SCNC: 26 MMOL/L (ref 21–32)
CREAT SERPL-MCNC: 1 MG/DL (ref 0.9–1.3)
DEPRECATED RDW RBC AUTO: 13.4 % (ref 12.4–15.4)
EOSINOPHIL # BLD: 0.2 K/UL (ref 0–0.6)
EOSINOPHIL NFR BLD: 1.9 %
EST. AVERAGE GLUCOSE BLD GHB EST-MCNC: 108.3 MG/DL
GFR SERPLBLD CREATININE-BSD FMLA CKD-EPI: 90 ML/MIN/{1.73_M2}
GLUCOSE SERPL-MCNC: 94 MG/DL (ref 70–99)
HBA1C MFR BLD: 5.4 %
HCT VFR BLD AUTO: 40.6 % (ref 40.5–52.5)
HDLC SERPL-MCNC: 39 MG/DL (ref 40–60)
HGB BLD-MCNC: 13.7 G/DL (ref 13.5–17.5)
LDLC SERPL CALC-MCNC: 144 MG/DL
LYMPHOCYTES # BLD: 2.6 K/UL (ref 1–5.1)
LYMPHOCYTES NFR BLD: 30.3 %
MCH RBC QN AUTO: 29.8 PG (ref 26–34)
MCHC RBC AUTO-ENTMCNC: 33.6 G/DL (ref 31–36)
MCV RBC AUTO: 88.5 FL (ref 80–100)
MONOCYTES # BLD: 0.7 K/UL (ref 0–1.3)
MONOCYTES NFR BLD: 7.8 %
NEUTROPHILS # BLD: 5.1 K/UL (ref 1.7–7.7)
NEUTROPHILS NFR BLD: 59.3 %
PLATELET # BLD AUTO: 322 K/UL (ref 135–450)
PMV BLD AUTO: 8.3 FL (ref 5–10.5)
POTASSIUM SERPL-SCNC: 4.3 MMOL/L (ref 3.5–5.1)
PROT SERPL-MCNC: 6.9 G/DL (ref 6.4–8.2)
RBC # BLD AUTO: 4.59 M/UL (ref 4.2–5.9)
SODIUM SERPL-SCNC: 138 MMOL/L (ref 136–145)
TRIGL SERPL-MCNC: 219 MG/DL (ref 0–150)
VLDLC SERPL CALC-MCNC: 44 MG/DL
WBC # BLD AUTO: 8.6 K/UL (ref 4–11)

## 2025-04-07 ENCOUNTER — RESULTS FOLLOW-UP (OUTPATIENT)
Dept: FAMILY MEDICINE CLINIC | Age: 55
End: 2025-04-07

## 2025-04-07 DIAGNOSIS — E78.2 MIXED HYPERLIPIDEMIA: Primary | ICD-10-CM

## 2025-04-15 ENCOUNTER — HOSPITAL ENCOUNTER (OUTPATIENT)
Dept: CT IMAGING | Age: 55
Discharge: HOME | End: 2025-04-15
Payer: COMMERCIAL

## 2025-04-15 DIAGNOSIS — Z87.891 PERSONAL HISTORY OF TOBACCO USE: ICD-10-CM

## 2025-04-15 PROCEDURE — 71271 CT THORAX LUNG CANCER SCR C-: CPT

## 2025-04-17 ENCOUNTER — RESULTS FOLLOW-UP (OUTPATIENT)
Dept: FAMILY MEDICINE CLINIC | Age: 55
End: 2025-04-17

## 2025-04-18 LAB — NONINV COLON CA DNA+OCC BLD SCRN STL QL: NEGATIVE

## 2025-05-06 ENCOUNTER — TELEPHONE (OUTPATIENT)
Dept: FAMILY MEDICINE CLINIC | Age: 55
End: 2025-05-06

## 2025-05-06 NOTE — TELEPHONE ENCOUNTER
[facility] called to see if they could get clearance for pt's surgery. States that he had seen you but there was no clearance on it. Can fax it to [phone].

## 2025-05-07 NOTE — TELEPHONE ENCOUNTER
Called [facility] and they stated okay to send letter and note from April 4 for medical clearance. Place note on Dr. Carlton carolina

## 2025-05-16 ENCOUNTER — TELEPHONE (OUTPATIENT)
Dept: FAMILY MEDICINE CLINIC | Age: 55
End: 2025-05-16

## 2025-05-16 NOTE — TELEPHONE ENCOUNTER
[facility] called and stated that they need on the office notes from 4/4 that he is cleared for surgery on 5/22.

## 2025-06-10 ENCOUNTER — TELEPHONE (OUTPATIENT)
Dept: FAMILY MEDICINE CLINIC | Age: 55
End: 2025-06-10

## 2025-06-10 ENCOUNTER — OFFICE VISIT (OUTPATIENT)
Dept: FAMILY MEDICINE CLINIC | Age: 55
End: 2025-06-10
Payer: COMMERCIAL

## 2025-06-10 VITALS
RESPIRATION RATE: 18 BRPM | HEART RATE: 95 BPM | DIASTOLIC BLOOD PRESSURE: 89 MMHG | BODY MASS INDEX: 22.07 KG/M2 | SYSTOLIC BLOOD PRESSURE: 133 MMHG | OXYGEN SATURATION: 96 % | WEIGHT: 172 LBS

## 2025-06-10 DIAGNOSIS — H26.9 CATARACT OF BOTH EYES, UNSPECIFIED CATARACT TYPE: Primary | ICD-10-CM

## 2025-06-10 DIAGNOSIS — Z01.818 PREOP EXAMINATION: ICD-10-CM

## 2025-06-10 PROCEDURE — 3017F COLORECTAL CA SCREEN DOC REV: CPT

## 2025-06-10 PROCEDURE — G8420 CALC BMI NORM PARAMETERS: HCPCS

## 2025-06-10 PROCEDURE — G8427 DOCREV CUR MEDS BY ELIG CLIN: HCPCS

## 2025-06-10 PROCEDURE — 99214 OFFICE O/P EST MOD 30 MIN: CPT

## 2025-06-10 PROCEDURE — 1036F TOBACCO NON-USER: CPT

## 2025-06-10 ASSESSMENT — ENCOUNTER SYMPTOMS
NAUSEA: 0
ABDOMINAL PAIN: 0
VOMITING: 0
COUGH: 0
DIARRHEA: 0
CONSTIPATION: 0
SHORTNESS OF BREATH: 0

## 2025-06-10 NOTE — PROGRESS NOTES
Component Value Date     04/04/2025    K 4.3 04/04/2025     04/04/2025    CO2 26 04/04/2025    BUN 22 (H) 04/04/2025    CREATININE 1.0 04/04/2025    GLUCOSE 94 04/04/2025    CALCIUM 9.5 04/04/2025     Lab Results   Component Value Date    WBC 8.6 04/04/2025    HGB 13.7 04/04/2025    HCT 40.6 04/04/2025    MCV 88.5 04/04/2025     04/04/2025     No components found for: \"HGBA1C\"    Assessment and Plan   Assessment & Plan  Cataract of both eyes, unspecified cataract type   Monitored by specialist- no acute findings meriting change in the plan         Preop examination    Patient is medically optimized to proceed with left cataract surgery.  He explains that he has significantly benefited from his right cataract surgery.  Patient does not have any current complaints to suggest active cardiac disease.  No indication for further cardiac workup prior to surgery at this time.  Patient currently not any medications.                     Betsy Vincent MD   6/10/25  4:15 PM        This dictation was generated by voice recognition computer software.  Although all attempts are made to edit the dictation for accuracy, there may be errors in the transcription that are not intended.